# Patient Record
Sex: MALE | URBAN - METROPOLITAN AREA
[De-identification: names, ages, dates, MRNs, and addresses within clinical notes are randomized per-mention and may not be internally consistent; named-entity substitution may affect disease eponyms.]

---

## 2020-12-23 ENCOUNTER — TELEPHONE (OUTPATIENT)
Dept: GASTROENTEROLOGY | Facility: CLINIC | Age: 60
End: 2020-12-23

## 2023-03-28 ENCOUNTER — HOSPITAL ENCOUNTER (EMERGENCY)
Facility: HOSPITAL | Age: 63
Discharge: HOME | End: 2023-03-28
Attending: EMERGENCY MEDICINE

## 2023-03-28 ENCOUNTER — APPOINTMENT (EMERGENCY)
Dept: RADIOLOGY | Facility: HOSPITAL | Age: 63
End: 2023-03-28

## 2023-03-28 VITALS — RESPIRATION RATE: 20 BRPM | DIASTOLIC BLOOD PRESSURE: 86 MMHG | SYSTOLIC BLOOD PRESSURE: 158 MMHG | TEMPERATURE: 98.5 F

## 2023-03-28 DIAGNOSIS — M79.605 PAIN OF LEFT LOWER EXTREMITY: Primary | ICD-10-CM

## 2023-03-28 RX ORDER — ACETAMINOPHEN 325 MG/1
975 TABLET ORAL ONCE
Status: COMPLETED | OUTPATIENT
Start: 2023-03-28 | End: 2023-03-28

## 2023-03-28 RX ADMIN — ACETAMINOPHEN 975 MG: 325 TABLET ORAL at 08:37

## 2023-03-28 NOTE — ED PROVIDER NOTES
History  Chief Complaint   Patient presents with   • Leg Injury     Pt fell at work, missed a step - reports Sharp pain in upper L thigh  Denies headstrike, no LOC, no blood thinners  Patient is a 58-year-old male with no significant past medical history presenting to the emergency department for evaluation of left upper leg pain  Patient reports he was at work when he missed a step coming out of a truck  Patient reports he landed on his feet but then stumbled onto the ground  Patient denies head strike or loss of consciousness  Denies blood thinners  Patient reports having a sharp pain to his left upper thigh  Reports pain is worse with ambulation and weightbearing  Denies fevers, chills, rash, headache, weakness, dizziness, visual changes, abdominal pain, nausea, vomiting, diarrhea, constipation, chest pain, shortness of breath or difficulty breathing  Does not offer any other concerns or complaints  None       No past medical history on file  No past surgical history on file  No family history on file  I have reviewed and agree with the history as documented  No existing history information found  No existing history information found  Review of Systems   Constitutional: Negative for chills and fever  HENT: Negative for ear pain and sore throat  Eyes: Negative for pain and visual disturbance  Respiratory: Negative for cough and shortness of breath  Cardiovascular: Negative for chest pain and palpitations  Gastrointestinal: Negative for abdominal pain and vomiting  Genitourinary: Negative for dysuria and hematuria  Musculoskeletal: Negative for arthralgias and back pain  Left upper leg pain   Skin: Negative for color change and rash  Neurological: Negative for seizures and syncope  All other systems reviewed and are negative  Physical Exam  Physical Exam  Vitals and nursing note reviewed     Constitutional:       General: He is not in acute distress  Appearance: Normal appearance  He is well-developed  He is not ill-appearing, toxic-appearing or diaphoretic  HENT:      Head: Normocephalic and atraumatic  Right Ear: External ear normal       Left Ear: External ear normal       Nose: Nose normal       Mouth/Throat:      Mouth: Mucous membranes are moist    Eyes:      General: No scleral icterus  Right eye: No discharge  Left eye: No discharge  Conjunctiva/sclera: Conjunctivae normal    Cardiovascular:      Rate and Rhythm: Normal rate and regular rhythm  Heart sounds: No murmur heard  Pulmonary:      Effort: Pulmonary effort is normal  No respiratory distress  Breath sounds: Normal breath sounds  Abdominal:      Palpations: Abdomen is soft  Tenderness: There is no abdominal tenderness  Musculoskeletal:         General: No swelling, deformity or signs of injury  Normal range of motion  Cervical back: Normal range of motion and neck supple  No rigidity  Right upper leg: Normal       Left upper leg: Tenderness present  No swelling, deformity or lacerations  Legs:       Comments: Also intact bilaterally  Patient reports pain is elicited with knee flexion and hip flexion  Skin:     General: Skin is warm and dry  Capillary Refill: Capillary refill takes less than 2 seconds  Coloration: Skin is not jaundiced  Findings: No erythema or rash  Neurological:      General: No focal deficit present  Mental Status: He is alert and oriented to person, place, and time  Mental status is at baseline  Cranial Nerves: No cranial nerve deficit  Gait: Gait normal    Psychiatric:         Mood and Affect: Mood normal          Behavior: Behavior normal          Thought Content:  Thought content normal          Judgment: Judgment normal          Vital Signs  ED Triage Vitals [03/28/23 0825]   Temperature Pulse Respirations Blood Pressure SpO2   98 5 °F (36 9 °C) -- 20 158/86 -- Temp Source Heart Rate Source Patient Position - Orthostatic VS BP Location FiO2 (%)   Oral -- Sitting Right arm --      Pain Score       8           Vitals:    03/28/23 0825   BP: 158/86   Patient Position - Orthostatic VS: Sitting         Visual Acuity      ED Medications  Medications   acetaminophen (TYLENOL) tablet 975 mg (975 mg Oral Given 3/28/23 0837)       Diagnostic Studies  Results Reviewed     None                 XR femur 2 views LEFT   Final Result by Betsy Latif MD (03/28 1513)   No acute osseous abnormality  Workstation performed: [id number]         XR knee 4+ views left injury   Final Result by Betsy Latif MD (03/28 1514)   No acute osseous abnormality  Workstation performed: [id number]         XR pelvis ap only 1 or 2 vw   Final Result by Betsy Latif MD (03/28 1512)   No acute osseous abnormality  Workstation performed: [id number]                    Procedures  Procedures         ED Course             SBIRT 22yo+    Flowsheet Row Most Recent Value   SBIRT (23 yo +)    In order to provide better care to our patients, we are screening all of our patients for alcohol and drug use  Would it be okay to ask you these screening questions? Yes Filed at: 03/28/2023 0825   Initial Alcohol Screen: US AUDIT-C     1  How often do you have a drink containing alcohol? 0 Filed at: 03/28/2023 0825   2  How many drinks containing alcohol do you have on a typical day you are drinking? 0 Filed at: 03/28/2023 0825   3a  Male UNDER 65: How often do you have five or more drinks on one occasion? 0 Filed at: 03/28/2023 0825   3b  FEMALE Any Age, or MALE 65+: How often do you have 4 or more drinks on one occassion? 0 Filed at: 03/28/2023 0825   Audit-C Score 0 Filed at: 03/28/2023 9399   VERONA: How many times in the past year have you    Used an illegal drug or used a prescription medication for non-medical reasons?  Never Filed at: 03/28/2023 [address] [place] [address] Decision Making    This is a 58-year-old male presenting to the emergency department for evaluation of left leg pain  Patient reports he was getting out of a truck at work when he missed a step and landed on his feet  Patient reports he did stumble after that and landed on the ground  Patient reports having pain to the left leg of which he initially landed on  Patient reports having pain to the upper aspect of his left leg  Patient reports pain with weightbearing and ambulating  Patient reports a sharp sensation in the upper left thigh  Patient denies any knee or ankle pain  Denies any decreased range of motion of the knee or ankle  Denies any hip pain  Patient is well-appearing with stable vital signs on initial examination  Differential diagnosis to include but is not limited to: Femur fracture, dislocation, strain, sprain or ligamentous injury    Initial ED Plan: X-ray left hip, left femur, left knee    ED results: Xray images independently visualized and interpreted by me - no acute fracture or dislocation  All radiology studies independently viewed by me and interpreted by the radiologist   -crutches supplied    Final ED assessment: Patient is stable and well appearing  Discussed radiologic studies  Discussed follow-up with PCP and orthopedics  Discussed crutches as needed to help with ambulation  Strict return precautions were discussed including but not limited to worsening pain, decreased range of motion, swelling, radiation of pain  Patient verbalized understanding and is agreeable with the plan for discharge  Amount and/or Complexity of Data Reviewed  Radiology: ordered  Risk  OTC drugs            Disposition  Final diagnoses:   Pain of left lower extremity     Time reflects when diagnosis was documented in both MDM as applicable and the Disposition within this note     Time User Action Codes Description Comment    3/28/2023  9:13 AM Joycelyn Wan Add [M56 138] Pain of left lower extremity       ED Disposition     ED Disposition   Discharge    Condition   Stable    Date/Time   Tue Mar 28, 2023  9:13 AM    Comment   Aranzaila Oliveros discharge to home/self care  Follow-up Information     Follow up With Specialties Details Why Contact Info Additional Information    your PCP  Call in 3 days For follow up      [facility] Go to  If symptoms worsen [facility] Emergency Department, [address] [place] [address], [address] [facility] Orthopedic Surgery Call in 3 days For follow up [address] [facility] [address] [id number]  [address] [facility] Specialists [facility], [address], [place], [place], [address] [facility]          There are no discharge medications for this patient  No discharge procedures on file      PDMP Review     None          ED Provider  Electronically Signed by           Beth Guerra PA-C  03/28/23 4297

## 2023-03-28 NOTE — ED NOTES
Patient transported to [facility] [place] [facility], RN  03/28/23 6399

## 2023-03-28 NOTE — Clinical Note
Jase Aditya was seen and treated in our emergency department on 3/28/2023  Diagnosis:     Edgar Shultz  may return to work on return date  He may return on this date: 04/01/2023         If you have any questions or concerns, please don't hesitate to call        Beena Wells PA-C    ______________________________           _______________          _______________  Hospital Representative                              Date                                Time

## 2023-03-28 NOTE — DISCHARGE INSTRUCTIONS
Follow up with PCP  Follow up with orthopedics  Tylenol/motrin as needed for pain  Return to the ED with new or worsening symptoms including but not limited to worsening pain, decreased ambulation, decreased range of motion

## 2023-07-24 ENCOUNTER — TELEPHONE (OUTPATIENT)
Age: 63
End: 2023-07-24

## 2023-07-24 NOTE — TELEPHONE ENCOUNTER
Patients GI provider:  Dr. Yanely Maldonado     Number to return call: [phone]    Reason for call: Pts wife calling to check when was her husbands last Colonoscopy with Dr. Yanely Maldonado     Scheduled procedure/appointment date if applicable: N/A

## 2023-07-24 NOTE — TELEPHONE ENCOUNTER
Patients wife called back,she is requesting that the information be left on her vm if she does not answer - he phone blocks unknown #'s.

## 2023-12-05 ENCOUNTER — TELEPHONE (OUTPATIENT)
Age: 63
End: 2023-12-05

## 2024-01-11 ENCOUNTER — TELEPHONE (OUTPATIENT)
Age: 64
End: 2024-01-11

## 2024-01-11 NOTE — TELEPHONE ENCOUNTER
Patients wife contacted office to arrange appointment with Dr. Tamez. Patient concerned if [facility] accepts insurance as it is [facility]. She is contacting insurance to be sure and will contact office back.